# Patient Record
Sex: FEMALE | Race: WHITE
[De-identification: names, ages, dates, MRNs, and addresses within clinical notes are randomized per-mention and may not be internally consistent; named-entity substitution may affect disease eponyms.]

---

## 2019-06-07 ENCOUNTER — HOSPITAL ENCOUNTER (EMERGENCY)
Dept: HOSPITAL 94 - ER | Age: 59
Discharge: HOME | End: 2019-06-07
Payer: MEDICARE

## 2019-06-07 VITALS — HEIGHT: 68 IN | BODY MASS INDEX: 17.71 KG/M2 | WEIGHT: 116.84 LBS

## 2019-06-07 VITALS — SYSTOLIC BLOOD PRESSURE: 145 MMHG | DIASTOLIC BLOOD PRESSURE: 96 MMHG

## 2019-06-07 DIAGNOSIS — J44.9: ICD-10-CM

## 2019-06-07 DIAGNOSIS — F15.10: ICD-10-CM

## 2019-06-07 DIAGNOSIS — I50.9: Primary | ICD-10-CM

## 2019-06-07 PROCEDURE — 99283 EMERGENCY DEPT VISIT LOW MDM: CPT

## 2019-06-07 PROCEDURE — 93005 ELECTROCARDIOGRAM TRACING: CPT

## 2019-11-18 ENCOUNTER — HOSPITAL ENCOUNTER (OUTPATIENT)
Dept: HOSPITAL 94 - SSTAY O | Age: 59
Setting detail: OBSERVATION
LOS: 1 days | Discharge: HOME | End: 2019-11-19
Attending: INTERNAL MEDICINE | Admitting: INTERNAL MEDICINE
Payer: MEDICARE

## 2019-11-18 VITALS — SYSTOLIC BLOOD PRESSURE: 136 MMHG | DIASTOLIC BLOOD PRESSURE: 101 MMHG

## 2019-11-18 VITALS — DIASTOLIC BLOOD PRESSURE: 91 MMHG | SYSTOLIC BLOOD PRESSURE: 132 MMHG

## 2019-11-18 VITALS — DIASTOLIC BLOOD PRESSURE: 91 MMHG | SYSTOLIC BLOOD PRESSURE: 126 MMHG

## 2019-11-18 VITALS — DIASTOLIC BLOOD PRESSURE: 101 MMHG | SYSTOLIC BLOOD PRESSURE: 142 MMHG

## 2019-11-18 VITALS — DIASTOLIC BLOOD PRESSURE: 91 MMHG | SYSTOLIC BLOOD PRESSURE: 124 MMHG

## 2019-11-18 VITALS — SYSTOLIC BLOOD PRESSURE: 130 MMHG | DIASTOLIC BLOOD PRESSURE: 95 MMHG

## 2019-11-18 VITALS — SYSTOLIC BLOOD PRESSURE: 125 MMHG | DIASTOLIC BLOOD PRESSURE: 92 MMHG

## 2019-11-18 VITALS — BODY MASS INDEX: 17.07 KG/M2 | WEIGHT: 112.66 LBS | HEIGHT: 68 IN

## 2019-11-18 VITALS — DIASTOLIC BLOOD PRESSURE: 92 MMHG | SYSTOLIC BLOOD PRESSURE: 122 MMHG

## 2019-11-18 VITALS — SYSTOLIC BLOOD PRESSURE: 133 MMHG | DIASTOLIC BLOOD PRESSURE: 97 MMHG

## 2019-11-18 VITALS — DIASTOLIC BLOOD PRESSURE: 97 MMHG | SYSTOLIC BLOOD PRESSURE: 133 MMHG

## 2019-11-18 DIAGNOSIS — R94.39: ICD-10-CM

## 2019-11-18 DIAGNOSIS — J44.9: ICD-10-CM

## 2019-11-18 DIAGNOSIS — I35.0: ICD-10-CM

## 2019-11-18 DIAGNOSIS — I50.9: Primary | ICD-10-CM

## 2019-11-18 DIAGNOSIS — I42.9: ICD-10-CM

## 2019-11-18 DIAGNOSIS — Z79.899: ICD-10-CM

## 2019-11-18 PROCEDURE — 99152 MOD SED SAME PHYS/QHP 5/>YRS: CPT

## 2019-11-18 PROCEDURE — 93458 L HRT ARTERY/VENTRICLE ANGIO: CPT

## 2019-11-18 PROCEDURE — 93005 ELECTROCARDIOGRAM TRACING: CPT

## 2019-11-18 NOTE — NUR
Patient is still arousable by name and touch. Patient is not ready to leave at this time. 
will continue to monitor.

## 2019-11-18 NOTE — NUR
I have received report from  TEJAS Gleason  and had the opportunity to ask questions and assume 
patient care. Per report: Patient had a right groin approach with manual hold closure heart 
cath procedure done by Dr. Isabel. No stents were placed. Patient received 2 of versed and 100 
of fentanyl. Patient had cold sweats and was snoring. Patient has a history of COPD, CHF, 
Cardiomyopathy, aortic insufficiency.

## 2019-11-18 NOTE — NUR
Patient is arousing by name and is drinking water. Patient states she does not have a ride 
at this time.

## 2019-11-18 NOTE — NUR
Patient arrived to [facility] unit room 314 with alli. Patient arrived with all known 
belongings. Patient was sleeping when she arrived and would arouse to voice and some 
touching. Right groin dressing, CDI, vital signs stable. Will continue to monitor.

## 2019-11-19 VITALS — SYSTOLIC BLOOD PRESSURE: 127 MMHG | DIASTOLIC BLOOD PRESSURE: 90 MMHG

## 2019-11-19 VITALS — DIASTOLIC BLOOD PRESSURE: 90 MMHG | SYSTOLIC BLOOD PRESSURE: 128 MMHG

## 2019-11-19 VITALS — SYSTOLIC BLOOD PRESSURE: 114 MMHG | DIASTOLIC BLOOD PRESSURE: 80 MMHG

## 2019-11-19 RX ADMIN — Medication SCH UNIT: at 01:25

## 2019-11-19 RX ADMIN — Medication SCH UNIT: at 07:39

## 2019-11-19 NOTE — NUR
PROVIDED PATIENT WITH DISCHARGE INSTRUCTIONS AS WELL AS DISCHARGE PACKET. NO NEW 
PRESCRIPTIONS. PATIENT AWARE SHE WILL CONTINUE ALL HOME MEDICATIONS. RIGHT GROIN SITE CLEAN 
DRY AND INTACT WITH NO BLEEDING OR HEMATOMA. PATIENT DENIES QUESTIONS OR CONCERNS. WILL CALL 
TO MAKE FOLLOW UP APPT WITH DR. MONTEMAYOR. IV REMOVED, CATHETER INTACT NO BLEEDING CLEAN GAUZE 
APPLIED AND SECURED WITH TAPE. PATIENT ACCOMPANIED DOWN VIA WHEELCHAIR TO GO HOME VIA 
PRIVATE VEHICLE.

## 2019-11-19 NOTE — NUR
Problems reprioritized. Patient report given, questions answered & plan of care reviewed 
with TEJAS Cisneros.

## 2019-11-24 ENCOUNTER — HOSPITAL ENCOUNTER (INPATIENT)
Dept: HOSPITAL 94 - ER | Age: 59
LOS: 1 days | Discharge: HOME | DRG: 301 | End: 2019-11-25
Attending: HOSPITALIST | Admitting: HOSPITALIST
Payer: MEDICARE

## 2019-11-24 VITALS — DIASTOLIC BLOOD PRESSURE: 71 MMHG | SYSTOLIC BLOOD PRESSURE: 103 MMHG

## 2019-11-24 VITALS — HEIGHT: 68 IN | WEIGHT: 112.44 LBS | BODY MASS INDEX: 17.04 KG/M2

## 2019-11-24 VITALS — DIASTOLIC BLOOD PRESSURE: 65 MMHG | SYSTOLIC BLOOD PRESSURE: 101 MMHG

## 2019-11-24 VITALS — DIASTOLIC BLOOD PRESSURE: 78 MMHG | SYSTOLIC BLOOD PRESSURE: 137 MMHG

## 2019-11-24 DIAGNOSIS — M54.9: ICD-10-CM

## 2019-11-24 DIAGNOSIS — Z86.73: ICD-10-CM

## 2019-11-24 DIAGNOSIS — Z79.899: ICD-10-CM

## 2019-11-24 DIAGNOSIS — F32.9: ICD-10-CM

## 2019-11-24 DIAGNOSIS — Z83.3: ICD-10-CM

## 2019-11-24 DIAGNOSIS — I72.4: ICD-10-CM

## 2019-11-24 DIAGNOSIS — Z82.3: ICD-10-CM

## 2019-11-24 DIAGNOSIS — I50.9: ICD-10-CM

## 2019-11-24 DIAGNOSIS — G89.29: ICD-10-CM

## 2019-11-24 DIAGNOSIS — Z23: ICD-10-CM
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
## 2019-11-24 DIAGNOSIS — T81.718A: Primary | ICD-10-CM

## 2019-11-24 DIAGNOSIS — J44.9: ICD-10-CM

## 2019-11-24 LAB
ALBUMIN SERPL BCP-MCNC: 2.4 G/DL (ref 3.4–5)
ALBUMIN/GLOB SERPL: 0.5 {RATIO} (ref 1.1–1.5)
ALP SERPL-CCNC: 113 IU/L (ref 46–116)
ALT SERPL W P-5'-P-CCNC: 21 U/L (ref 12–78)
ANION GAP SERPL CALCULATED.3IONS-SCNC: 4 MMOL/L (ref 8–16)
APTT PPP: 29 SECONDS (ref 22–32)
AST SERPL W P-5'-P-CCNC: 27 U/L (ref 10–37)
BASOPHILS # BLD AUTO: 0.1 X10'3 (ref 0–0.2)
BASOPHILS NFR BLD AUTO: 0.9 % (ref 0–1)
BILIRUB SERPL-MCNC: 1 MG/DL (ref 0.1–1)
BUN SERPL-MCNC: 12 MG/DL (ref 7–18)
BUN/CREAT SERPL: 14 (ref 6.6–38)
CALCIUM SERPL-MCNC: 8.3 MG/DL (ref 8.5–10.1)
CHLORIDE SERPL-SCNC: 107 MMOL/L (ref 99–107)
CO2 SERPL-SCNC: 30.4 MMOL/L (ref 24–32)
CREAT SERPL-MCNC: 0.86 MG/DL (ref 0.4–0.9)
EOSINOPHIL # BLD AUTO: 0.3 X10'3 (ref 0–0.9)
EOSINOPHIL NFR BLD AUTO: 3.8 % (ref 0–6)
ERYTHROCYTE [DISTWIDTH] IN BLOOD BY AUTOMATED COUNT: 22.6 % (ref 11.5–14.5)
GFR SERPL CREATININE-BSD FRML MDRD: 67 ML/MIN
GLUCOSE SERPL-MCNC: 89 MG/DL (ref 70–104)
HCT VFR BLD AUTO: 36.3 % (ref 35–45)
HGB BLD-MCNC: 11.7 G/DL (ref 12–16)
LYMPHOCYTES # BLD AUTO: 1.7 X10'3 (ref 1.1–4.8)
LYMPHOCYTES NFR BLD AUTO: 24.4 % (ref 21–51)
MCH RBC QN AUTO: 27.2 PG (ref 27–31)
MCHC RBC AUTO-ENTMCNC: 32.2 G/DL (ref 33–36.5)
MCV RBC AUTO: 84.5 FL (ref 78–98)
MONOCYTES # BLD AUTO: 0.8 X10'3 (ref 0–0.9)
MONOCYTES NFR BLD AUTO: 11.9 % (ref 2–12)
NEUTROPHILS # BLD AUTO: 4.1 X10'3 (ref 1.8–7.7)
NEUTROPHILS NFR BLD AUTO: 59 % (ref 42–75)
PLATELET # BLD AUTO: 313 X10'3 (ref 140–440)
PMV BLD AUTO: 8 FL (ref 7.4–10.4)
POTASSIUM SERPL-SCNC: 4.1 MMOL/L (ref 3.5–5.1)
PROT SERPL-MCNC: 7 G/DL (ref 6.4–8.2)
RBC # BLD AUTO: 4.3 X10'6 (ref 4.2–5.6)
SODIUM SERPL-SCNC: 141 MMOL/L (ref 135–145)
WBC # BLD AUTO: 7 X10'3 (ref 4.5–11)

## 2019-11-24 PROCEDURE — 96372 THER/PROPH/DIAG INJ SC/IM: CPT

## 2019-11-24 PROCEDURE — 99285 EMERGENCY DEPT VISIT HI MDM: CPT

## 2019-11-24 PROCEDURE — 96374 THER/PROPH/DIAG INJ IV PUSH: CPT

## 2019-11-24 PROCEDURE — 87081 CULTURE SCREEN ONLY: CPT

## 2019-11-24 PROCEDURE — 93926 LOWER EXTREMITY STUDY: CPT

## 2019-11-24 PROCEDURE — 36002 PSEUDOANEURYSM INJECTION TRT: CPT

## 2019-11-24 PROCEDURE — 85610 PROTHROMBIN TIME: CPT

## 2019-11-24 PROCEDURE — 90732 PPSV23 VACC 2 YRS+ SUBQ/IM: CPT

## 2019-11-24 PROCEDURE — 85025 COMPLETE CBC W/AUTO DIFF WBC: CPT

## 2019-11-24 PROCEDURE — 85730 THROMBOPLASTIN TIME PARTIAL: CPT

## 2019-11-24 PROCEDURE — 3E02340 INTRODUCTION OF INFLUENZA VACCINE INTO MUSCLE, PERCUTANEOUS APPROACH: ICD-10-PCS | Performed by: RADIOLOGY

## 2019-11-24 PROCEDURE — 3E0234Z INTRODUCTION OF SERUM, TOXOID AND VACCINE INTO MUSCLE, PERCUTANEOUS APPROACH: ICD-10-PCS | Performed by: RADIOLOGY

## 2019-11-24 PROCEDURE — 80053 COMPREHEN METABOLIC PANEL: CPT

## 2019-11-24 NOTE — NUR
Assumed care of patient with verbal report from Shania LAZARO.  Patient resting comfortably, woke 
easily.  Sats 89%, O2 placed on .5L.  Call light in reach.

## 2019-11-24 NOTE — NUR
SHIFT CHANGE NOTE

Problems reprioritized. Patient report given, questions answered & plan of care reviewed 
with TEJAS GUERRERO.

## 2019-11-24 NOTE — NUR
PATIENT STATES WHEN SHE DOES NEED HELP WHEN SHE HAS RESPIRATORY ISSUES, HAS A [employer] WORKER 
THAT COMES TO THE HOUSE TO HELP HER

-------------------------------------------------------------------------------

Addendum: 11/24/19 at 1825 by Shania Sibley RN

-------------------------------------------------------------------------------

Amended: Links added.

## 2019-11-25 VITALS — DIASTOLIC BLOOD PRESSURE: 91 MMHG | SYSTOLIC BLOOD PRESSURE: 136 MMHG

## 2019-11-25 VITALS — SYSTOLIC BLOOD PRESSURE: 127 MMHG | DIASTOLIC BLOOD PRESSURE: 101 MMHG

## 2019-11-25 NOTE — NUR
Patient in room [facility]. I have received report from  TEJAS GUERRERO  and had the opportunity to 
ask questions and assume patient care.

## 2019-11-25 NOTE — NUR
Problems reprioritized. Patient report given, questions answered & plan of care reviewed 
with Emeterio LAZARO.

## 2019-11-25 NOTE — NUR
patient daughter here to  patient. patient dc'd with all personal belongings via 
wheelchair accompanied by [facility] staff. patient alert and oriented and in no apparent acute 
distress at time of dc.

## 2019-11-25 NOTE — NUR
patient is a&o with no complaints. discussed with patient discharge instructions and 
prescription for norco. patient verbalizes understanding of teaching. patient has all 
personal belongings packed and ready for dc however patient is working on getting 
transportation home.